# Patient Record
Sex: FEMALE | Race: WHITE | Employment: FULL TIME | URBAN - METROPOLITAN AREA
[De-identification: names, ages, dates, MRNs, and addresses within clinical notes are randomized per-mention and may not be internally consistent; named-entity substitution may affect disease eponyms.]

---

## 2019-01-21 ENCOUNTER — OFFICE VISIT (OUTPATIENT)
Dept: ORTHOPEDIC SURGERY | Age: 64
End: 2019-01-21
Payer: COMMERCIAL

## 2019-01-21 VITALS — HEIGHT: 68 IN | BODY MASS INDEX: 23.49 KG/M2 | WEIGHT: 155 LBS

## 2019-01-21 DIAGNOSIS — R52 PAIN: ICD-10-CM

## 2019-01-21 DIAGNOSIS — T84.84XA PAINFUL TOTAL KNEE REPLACEMENT, INITIAL ENCOUNTER (HCC): Primary | ICD-10-CM

## 2019-01-21 DIAGNOSIS — Z96.659 PAINFUL TOTAL KNEE REPLACEMENT, INITIAL ENCOUNTER (HCC): Primary | ICD-10-CM

## 2019-01-21 PROCEDURE — 1036F TOBACCO NON-USER: CPT | Performed by: ORTHOPAEDIC SURGERY

## 2019-01-21 PROCEDURE — G8427 DOCREV CUR MEDS BY ELIG CLIN: HCPCS | Performed by: ORTHOPAEDIC SURGERY

## 2019-01-21 PROCEDURE — 99203 OFFICE O/P NEW LOW 30 MIN: CPT | Performed by: ORTHOPAEDIC SURGERY

## 2019-01-21 PROCEDURE — 1090F PRES/ABSN URINE INCON ASSESS: CPT | Performed by: ORTHOPAEDIC SURGERY

## 2019-01-21 PROCEDURE — 1101F PT FALLS ASSESS-DOCD LE1/YR: CPT | Performed by: ORTHOPAEDIC SURGERY

## 2019-01-21 PROCEDURE — 1123F ACP DISCUSS/DSCN MKR DOCD: CPT | Performed by: ORTHOPAEDIC SURGERY

## 2019-01-21 PROCEDURE — G8420 CALC BMI NORM PARAMETERS: HCPCS | Performed by: ORTHOPAEDIC SURGERY

## 2019-01-21 PROCEDURE — G8400 PT W/DXA NO RESULTS DOC: HCPCS | Performed by: ORTHOPAEDIC SURGERY

## 2019-01-21 PROCEDURE — 3017F COLORECTAL CA SCREEN DOC REV: CPT | Performed by: ORTHOPAEDIC SURGERY

## 2019-01-21 PROCEDURE — 4040F PNEUMOC VAC/ADMIN/RCVD: CPT | Performed by: ORTHOPAEDIC SURGERY

## 2019-01-21 PROCEDURE — G8484 FLU IMMUNIZE NO ADMIN: HCPCS | Performed by: ORTHOPAEDIC SURGERY

## 2019-01-21 RX ORDER — ALPRAZOLAM 0.25 MG/1
0.25 TABLET ORAL
COMMUNITY

## 2019-01-21 RX ORDER — ZOLPIDEM TARTRATE 10 MG/1
10 TABLET ORAL
COMMUNITY
Start: 2019-01-07 | End: 2019-04-07

## 2019-01-21 RX ORDER — LITHIUM CARBONATE 300 MG/1
600 TABLET, FILM COATED, EXTENDED RELEASE ORAL
COMMUNITY

## 2019-01-21 RX ORDER — LEVOTHYROXINE SODIUM 112 UG/1
112 TABLET ORAL
COMMUNITY
Start: 2018-12-04

## 2019-01-21 RX ORDER — NAFTIFINE HYDROCHLORIDE 1 MG/G
CREAM TOPICAL
COMMUNITY
Start: 2017-09-29

## 2019-01-21 RX ORDER — VENLAFAXINE HYDROCHLORIDE 150 MG/1
150 CAPSULE, EXTENDED RELEASE ORAL
COMMUNITY

## 2019-01-21 RX ORDER — VENLAFAXINE HYDROCHLORIDE 75 MG/1
75 CAPSULE, EXTENDED RELEASE ORAL
COMMUNITY

## 2019-01-21 RX ORDER — NABUMETONE 750 MG/1
750 TABLET, FILM COATED ORAL
COMMUNITY

## 2019-01-21 RX ORDER — TRAZODONE HYDROCHLORIDE 50 MG/1
50 TABLET ORAL
COMMUNITY

## 2019-04-08 ENCOUNTER — OFFICE VISIT (OUTPATIENT)
Dept: ORTHOPEDIC SURGERY | Age: 64
End: 2019-04-08
Payer: COMMERCIAL

## 2019-04-08 VITALS — HEIGHT: 68 IN | BODY MASS INDEX: 23.49 KG/M2 | WEIGHT: 154.98 LBS

## 2019-04-08 DIAGNOSIS — Z96.659 PAINFUL TOTAL KNEE REPLACEMENT, INITIAL ENCOUNTER (HCC): Primary | ICD-10-CM

## 2019-04-08 DIAGNOSIS — T84.84XA PAINFUL TOTAL KNEE REPLACEMENT, INITIAL ENCOUNTER (HCC): Primary | ICD-10-CM

## 2019-04-08 PROCEDURE — 1090F PRES/ABSN URINE INCON ASSESS: CPT | Performed by: ORTHOPAEDIC SURGERY

## 2019-04-08 PROCEDURE — 3017F COLORECTAL CA SCREEN DOC REV: CPT | Performed by: ORTHOPAEDIC SURGERY

## 2019-04-08 PROCEDURE — G8400 PT W/DXA NO RESULTS DOC: HCPCS | Performed by: ORTHOPAEDIC SURGERY

## 2019-04-08 PROCEDURE — 1036F TOBACCO NON-USER: CPT | Performed by: ORTHOPAEDIC SURGERY

## 2019-04-08 PROCEDURE — 4040F PNEUMOC VAC/ADMIN/RCVD: CPT | Performed by: ORTHOPAEDIC SURGERY

## 2019-04-08 PROCEDURE — G8427 DOCREV CUR MEDS BY ELIG CLIN: HCPCS | Performed by: ORTHOPAEDIC SURGERY

## 2019-04-08 PROCEDURE — 1123F ACP DISCUSS/DSCN MKR DOCD: CPT | Performed by: ORTHOPAEDIC SURGERY

## 2019-04-08 PROCEDURE — G8420 CALC BMI NORM PARAMETERS: HCPCS | Performed by: ORTHOPAEDIC SURGERY

## 2019-04-08 PROCEDURE — 99213 OFFICE O/P EST LOW 20 MIN: CPT | Performed by: ORTHOPAEDIC SURGERY

## 2019-04-08 RX ORDER — CEPHALEXIN 500 MG/1
CAPSULE ORAL
Qty: 3 CAPSULE | Refills: 0 | Status: SHIPPED | OUTPATIENT
Start: 2019-04-08

## 2019-04-08 NOTE — PROGRESS NOTES
Chief Complaint    Follow-up (L Knee - still has pain, went back to Dr. Saray Silver, did not have much to offer her. Had fluid taken of knee. culture taken (in Epic), had Bone Scan. Did not have blood work drawn, states did not know she was supposed to.)      History of Present Illness:  Rosa M Lynn is a 70 y.o. female returns today for reevaluation of her left knee. Patient is here with a chief complaint of left knee pain. Patient did have a left total knee arthroplasty performed by Dr. Veronica Young in 2016. Patient then had a revision total knee arthroplasty also done by Dr. Veronica Young on 1/12/2018. Revision surgery was synovectomy and polyethylene exchange. Before surgery patient's symptoms were concentrated over the patellofemoral joint. She went back and saw Dr. Kyaw Lopes did do an aspiration of the knee which did show some elevations in the nuclear cells at 925 (normal range 0-149) and a elevation the neutrophil count at 56 (normal range 0-24%. )  She also bone scan completed back in March which showed no abnormal activity around the left knee prosthesis. Pain Assessment  Location of Pain: Knee  Location Modifiers: Left]       Medical History:  Past Medical History:   Diagnosis Date    Osteoarthritis     Thyroid disease      Patient Active Problem List    Diagnosis Date Noted    Painful total knee replacement, initial encounter ([facility] 01/21/2019     Past Surgical History:   Procedure Laterality Date    JOINT REPLACEMENT      KNEE ARTHROSCOPY      KNEE SURGERY      SHOULDER SURGERY      TONSILLECTOMY       History reviewed. No pertinent family history.   Social History     Socioeconomic History    Marital status: Single     Spouse name: None    Number of children: None    Years of education: None    Highest education level: None   Occupational History    None   Social Needs    Financial resource strain: None    Food insecurity:     Worry: None     Inability: None    Transportation needs: Medical: None     Non-medical: None   Tobacco Use    Smoking status: Never Smoker    Smokeless tobacco: Never Used   Substance and Sexual Activity    Alcohol use: None    Drug use: None    Sexual activity: None   Lifestyle    Physical activity:     Days per week: None     Minutes per session: None    Stress: None   Relationships    Social connections:     Talks on phone: None     Gets together: None     Attends Restoration service: None     Active member of club or organization: None     Attends meetings of clubs or organizations: None     Relationship status: None    Intimate partner violence:     Fear of current or ex partner: None     Emotionally abused: None     Physically abused: None     Forced sexual activity: None   Other Topics Concern    None   Social History Narrative    None     Current Outpatient Medications   Medication Sig Dispense Refill    ALPRAZolam (XANAX) 0.25 MG tablet Take 0.25 mg by mouth. Albertus Reel vitamin D 1000 units CAPS Take 3,000 Units by mouth      lithium (LITHOBID) 300 MG extended release tablet Take 600 mg by mouth      Prenatal Vit-Fe Fumarate-FA (M-VIT PO) daily. Centrum silver      nabumetone (RELAFEN) 750 MG tablet Take 750 mg by mouth      naftifine (NAFTIN) 1 % cream Apply BID      levothyroxine (SYNTHROID) 112 MCG tablet Take 112 mcg by mouth      venlafaxine (EFFEXOR XR) 150 MG extended release capsule Take 150 mg by mouth      traZODone (DESYREL) 50 MG tablet Take 50 mg by mouth      venlafaxine (EFFEXOR XR) 75 MG extended release capsule Take 75 mg by mouth       No current facility-administered medications for this visit. Review of Systems:  Relevant review of systems reviewed and available in the patient's chart    Vital Signs: There were no vitals filed for this visit. General Exam:   Constitutional: Patient is adequately groomed with no evidence of malnutrition  DTRs: Deep tendon reflexes are intact  Mental Status:  The patient is oriented to time, place and person. The patient's mood and affect are appropriate. Lymphatic: The lymphatic examination bilaterally reveals all areas to be without enlargement or induration. Vascular: Examination reveals no swelling or calf tenderness. Peripheral pulses are palpable and 2+. Neurological: The patient has good coordination. There is no weakness or sensory deficit. Body mass index is 23.57 kg/m². Left Knee Examination:    Inspection:  No erythema or signs of infection. There are no cutaneous lesions    Palpation:  There is no tenderness to palpation along the medial and lateral joint line joint line. Tenderness to palpation over the patella. Range of Motion:  0 extension to 120 of active flexion. Strength:  4+/5 quadriceps and hamstrings    Special Tests: The knee is stable to varus valgus stressing/anterior posterior drawer. Negative Homans test.                                 Skin: There are no rashes, ulcerations or lesions. Gait: mildly antalgic favoring the right side    Reflex 2+ patellar    Examination of the right and left hip reveals intact skin. The patient demonstrates full painless range of motion with regards to flexion, abduction, internal and external rotation. There is no tenderness about the greater trochanter. There is a negative straight leg raise against resistance. Strength is 5/5 throughout all planes. Radiology:   EXAM: NUCLEAR MEDICINE THREE-PHASE BONE SCAN           INDICATION: Pain. Left knee replacement.           COMPARISON: None           TECHNIQUE:   Radiopharmaceutical: Technetium 99m MDP: 26 mCi IV   Blood flow and blood pool imaging region: Lower extremities   Delayed imaging area: Lower extremities           FINDINGS:           Blood flow imaging demonstrates symmetric activity.           Blood pool imaging demonstrates symmetric activity.           Delayed images demonstrate symmetric, mild activity adjacent to both knee prostheses.  No    unexpected areas of activity are noted. .       Impression:  Encounter Diagnosis   Name Primary?  Painful total knee replacement, initial encounter ([facility] Yes       Office Procedures:  Orders Placed This Encounter   Procedures    US ARTHR/ASP/INJ MAJOR JNT/BURSA LEFT     Standing Status:   Future     Number of Occurrences:   1     Standing Expiration Date:   4/8/2020    CBC WITH AUTO DIFFERENTIAL     Standing Status:   Future     Standing Expiration Date:   4/8/2020    SEDIMENTATION RATE     Standing Status:   Future     Standing Expiration Date:   4/8/2020    C-REACTIVE PROTEIN     Standing Status:   Future     Standing Expiration Date:   4/8/2020   Procedure: Left knee aspiration  I discussed in detail the risks, benefits, and complications of an aspiration/injection which included but is not limited to infection, skin reactions, hot swollen joints, and anaphylaxis with the patient. The patient verbalized good understanding and gave informed consent for the left knee procedure. The patient was placed in the supine position on the exam table and the skin was prepped using sterile alcohol solution. The superiolateral aspect of the left knee was prepped and draped with sterile technique. The skin and subcutaneous tissues were anesthestized with 5 mL of 1% lidocaine, injected with a 22-gauge needle. After approximately 5 minutes, an 18-gauge needle attached to a 60-ml syringe was inserted into the knee. Then, 10 ml of cloudy synovial fluid was aspirated. . The needle was withdrawn and the puncture site sealed with a Band-Aid. Technique: Under sterile conditions a [employer] ultrasound unit with a variable frequency (6.0-15.0 MHz) linear transducer was used to localize the placement of a 22-gauge needle into the Knee. Findings: Successful needle placement for Knee Aspiration/injection. Final images were taken and saved for permanent record. The patient tolerated the injection well.  The patient was instructed to

## 2019-04-29 ENCOUNTER — OFFICE VISIT (OUTPATIENT)
Dept: ORTHOPEDIC SURGERY | Age: 64
End: 2019-04-29
Payer: COMMERCIAL

## 2019-04-29 DIAGNOSIS — T84.84XD PAIN DUE TO TOTAL LEFT KNEE REPLACEMENT, SUBSEQUENT ENCOUNTER: Primary | ICD-10-CM

## 2019-04-29 DIAGNOSIS — Z96.652 PAIN DUE TO TOTAL LEFT KNEE REPLACEMENT, SUBSEQUENT ENCOUNTER: Primary | ICD-10-CM

## 2019-04-29 PROCEDURE — G8420 CALC BMI NORM PARAMETERS: HCPCS | Performed by: ORTHOPAEDIC SURGERY

## 2019-04-29 PROCEDURE — 1123F ACP DISCUSS/DSCN MKR DOCD: CPT | Performed by: ORTHOPAEDIC SURGERY

## 2019-04-29 PROCEDURE — G8428 CUR MEDS NOT DOCUMENT: HCPCS | Performed by: ORTHOPAEDIC SURGERY

## 2019-04-29 PROCEDURE — 99214 OFFICE O/P EST MOD 30 MIN: CPT | Performed by: ORTHOPAEDIC SURGERY

## 2019-04-29 PROCEDURE — 3017F COLORECTAL CA SCREEN DOC REV: CPT | Performed by: ORTHOPAEDIC SURGERY

## 2019-04-29 PROCEDURE — 1036F TOBACCO NON-USER: CPT | Performed by: ORTHOPAEDIC SURGERY

## 2019-04-29 PROCEDURE — 4040F PNEUMOC VAC/ADMIN/RCVD: CPT | Performed by: ORTHOPAEDIC SURGERY

## 2019-04-29 PROCEDURE — G8400 PT W/DXA NO RESULTS DOC: HCPCS | Performed by: ORTHOPAEDIC SURGERY

## 2019-04-29 PROCEDURE — 1090F PRES/ABSN URINE INCON ASSESS: CPT | Performed by: ORTHOPAEDIC SURGERY

## 2019-04-29 NOTE — LETTER
CONSENT TO SURGICAL OR MEDICAL PROCEDURE    PATIENTS NAMES: Poppy Bob 1955  70 y.o. [phone] (home)   DATE/TIME: 4/29/2019 2:43 PM    1) I consent that Dr. Clint Sultana perform one or more surgical and or medical procedures on the above named patient at: [address] [place] [address]/[facility] to treat the condition(s) which appear indicated by the diagnostic studies already preformed. I have been told in general terms the nature and purpose of the procedure(s) and what the procedure(s) is/are expected to accomplish. They procedure(s) are as follows:   LEFT REVISION KNEE REPLACEMENT (PATELLAR COMPONENT VS TOTAL--DEPUY) AND LATERAL RELEASE     2) It has been explained to me by the informing physician that during the course of any surgical or medical procedure unforeseen condition(s) may be revealed that necessitate an extension of the original procedure(s) or a different procedure(s) than set forth in Paragraph 1. I therefore consent that the above named physician perform such additional or different procedure(s) as are necessary or desirable in the exercise of his professional judgement. 3) I have been made aware by the informing physician of certain reasonably known risks that are associated with the procedure(s) set forth in Paragraph 1.  I understand the reasonably known risk to be: Including but not limited to: CVA, infection, M.I., Phlebitis, Cardiac Arrest and Pulmonary Embolism, Loss of Circulation, Nerve Injury, Delayed Healing, Recurrence, Loss of extremity/digit, R.S.D., Screw breakage, Arthritis, Pain, Swelling, Stiffness, Failure of Prothesis, Fracture, Leg length discrepancy, Wound complication/non-healing, need for further surgery and persistent pain.   4) I have also been informed by the informing physician that there are other risks, from both known and unknown causes, that are attendant to the performance of any surgical or medical procedure(s). I am aware that the practice of surgery and medicine is not an exact science, and I acknowledge that no guarantees have been made to me concerning the results of the procedure(s). 5) I consent to the taking of photographs before, during and after the procedure(s) for scientific and educational purposes. I also understand that medical students and residents may participate in the procedure(s) set forth in Paragraph 1, and I consent to their participation under the supervision of the above named physician. 6) I consent to the administration of anesthesia and to the use of such anesthetics as may be deemed advisable by the anesthesiologist engaged to administer anesthesia. 7) I certify that I have read and understand this consent to the surgical or medical procedure(s); that all the information contained herein was disclosed to me by the informing physician prior to my signing; that all blanks or statements requiring insertions or completion were filled in and inapplicable paragraphs, if any, were stricken before I signed; and that all questions asked by me about the procedure(s) have been fully answered by the informing physician in a satisfactory manner.    ________________________________                           _______________________________  Signature of patient                                                                  Michelle Cai M.D.  ________________________________                           ________________________________  Signature of Informing Physician                                           Informing Physician (Print)    If patient is unable to sign or is a minor, complete one of the following:   A) Patient is a minor ______________ years of age.    B) Patient is unable to sign because_________________________________________________    The undersigned represents that he or she is duly authorized to execute to this consent for and on the behalf of the above named patient.    ________________________________             __________________________________________  Witness                                                                         Parent/Spouse/Guardian/Other:_________________    Medical Record#  Insurance  Smartphone:  Yes   Or   No  Email:                 You have signed a consent to have a total joint replacement surgery performed. Before you can proceed with surgery the following things must be done. Please use this form as a checklist.      _____   Please schedule your Physical Therapy functional evaluation. At the location on your script.    _____   Please take your lab orders and get your blood work done at a [facility].    _____  [place] Linda will need to go to [facility] to complete registration and the medical questionnaire prior to your physical therapy evaluation. Do not schedule an appointment with your primary care physician until you have a surgery date. This pre-op exam has to be within 30 days of the surgery. _____  CT Scans will be scheduled by our office.    _____  Information about the pre-op class will be in your surgery packet that will be mailed to you after you are scheduled for surgery. Once you have completed both the labs and the evaluation please call Gardenia Terry @ [phone] to let her know. Once verification of the PT Evaluation and completed labs has been determined you will be called and set up for surgery. This may take 1-2 days to check results and return your phone call.

## 2019-04-29 NOTE — PROGRESS NOTES
Chief Complaint    Results (labs/synovasure lt knee)      History of Present Illness:  Dank Kuo is a 70 y.o. female returns today for follow-up of her left anterior knee pain. She had a left total knee replacement performed by Dr. Seth Lr for primary osteoarthritis of the knee. She has persistent 8 out of 10 anterior knee pain without relief. She's tried oral anti-inflammatories, bracing, physical therapy none of which significantly decrease of pain. She also had a right knee performed and this has been performing fairly well. She did have labs and a bone scan performed. We also did an aspiration with alpha defensin. Pain Assessment  Location of Pain: Knee  Location Modifiers: Left  Severity of Pain: 8  Frequency of Pain: Intermittent  Aggravating Factors: Walking, Standing, Stairs  Limiting Behavior: Some  Work-Related Injury: No  Are there other pain locations you wish to document?: No    Medical History:  Patient's medications, allergies, past medical, surgical, social and family histories were reviewed and updated as appropriate. Review of Systems:  Pertinent items are noted in HPI  Review of systems reviewed from Patient History Form dated on last visit and available in the patient's chart under the Media tab. Vital Signs: There were no vitals taken for this visit. General Exam:   Constitutional: Patient is adequately groomed with no evidence of malnutrition  Mental Status: The patient is oriented to time, place and person. The patient's mood and affect are appropriate. Lymphatic: The lymphatic examination bilaterally reveals all areas to be without enlargement or induration. Vascular: Examination reveals no swelling or calf tenderness. Peripheral pulses are palpable and 2+. Neurological: The patient has good coordination. There is no weakness or sensory deficit.     Left Knee Examination:    Inspection:  No skin abnormalities noted, no evidence of lymphangitis or lymphedema, ecchymosis or bruising. Palpation:  Pain to palpation around the anterior aspect of her knee both medially and laterally. Range of Motion:  Fully cemented to 120° knee flexion. Strength:  4+5 quad and hamstring strength    Special Tests:  Positive patellar grind test, no evidence of instability. Skin: There are no rashes, ulcerations or lesions. Gait: Antalgic gait pattern on the left      Additional Comments:       Additional Examinations:         Right Lower Extremity: Examination of the right lower extremity does not show any tenderness, deformity or injury. Range of motion is unremarkable. There is no gross instability. There are no rashes, ulcerations or lesions. Strength and tone are normal.    Radiology:     X-rays obtained and reviewed in office:  Views 3  Location left knee including AP, lateral and skyline view  Impression aseptic loosening of the patellar component. The tibiofemoral component seemed to be in good position. Assessment :  Aseptic loosening left total knee, patellar component    Impression:  Encounter Diagnosis   Name Primary?  Pain due to total left knee replacement, subsequent encounter Yes       Office Procedures:  No orders of the defined types were placed in this encounter. Treatment Plan:  Risks, benefits, alternatives to surgery clear discussed, patient would like to proceed with revision left total knee replacement primarily patellofemoral component but we will examine the tibia and femur and see if these are loose. She will also benefit from a lateral retinacular release. Risks benefits alternatives to surgery clear discussed, patient would like to proceed with surgery. We discussed the risk, benefits, and potential complications of knee replacement arthroplasty surgery. The patient voiced their understanding to concerns that include infection, deep vein thrombosis, neurological injury, and delayed rehabilitation.  The patient also realizes that there are concerns regarding the potential need for manipulation under anesthesia if range of motion proves to be problematic. The patient also understands that there is always a chance of dystrophy and anesthetic complications that would include a stroke, cardiopulmonary pathology, and even death. We also discussed the rehabilitation process involved with this operation and options that involved not only the hospitalization but the need for outpatient physical therapy as well as an independent home exercise program. The patient also realizes the need for a knee brace and ambulatory aids in the rehabilitation process as well as the very significant role that the patient plays in terms of rehabilitation after this type of operation. They understand that overall that they may be functional at 6-8 weeks postop but it may take 9 months to year for a full recovery. All questions were answered.

## 2019-05-15 ENCOUNTER — TELEPHONE (OUTPATIENT)
Dept: ORTHOPEDIC SURGERY | Age: 64
End: 2019-05-15

## 2019-05-16 ENCOUNTER — TELEPHONE (OUTPATIENT)
Dept: ORTHOPEDIC SURGERY | Age: 64
End: 2019-05-16

## 2019-05-17 ENCOUNTER — TELEPHONE (OUTPATIENT)
Dept: ORTHOPEDIC SURGERY | Age: 64
End: 2019-05-17

## 2019-05-17 NOTE — TELEPHONE ENCOUNTER
FYI - PATIENT CANCELLED SURGERY FOR 6/4 DUE TO AN INJURY AT WORK. SHE WILL CALL TO RESCHEDULE ONCE SHE IS READY.

## 2019-06-05 ENCOUNTER — TELEPHONE (OUTPATIENT)
Dept: ORTHOPEDIC SURGERY | Age: 64
End: 2019-06-05

## 2019-06-05 NOTE — TELEPHONE ENCOUNTER
The insurance company called to confirm the patient had sx yesterday. I looked up the chart notes and saw the patient cancelled surgery for now and will call back to reschedule.

## 2019-11-05 ENCOUNTER — OFFICE VISIT (OUTPATIENT)
Dept: ORTHOPEDIC SURGERY | Age: 64
End: 2019-11-05
Payer: COMMERCIAL

## 2019-11-05 VITALS
DIASTOLIC BLOOD PRESSURE: 76 MMHG | WEIGHT: 154.98 LBS | HEART RATE: 76 BPM | SYSTOLIC BLOOD PRESSURE: 115 MMHG | HEIGHT: 68 IN | BODY MASS INDEX: 23.49 KG/M2

## 2019-11-05 DIAGNOSIS — T84.84XA PAINFUL TOTAL KNEE REPLACEMENT, INITIAL ENCOUNTER (HCC): Primary | ICD-10-CM

## 2019-11-05 DIAGNOSIS — Z96.659 PAINFUL TOTAL KNEE REPLACEMENT, INITIAL ENCOUNTER (HCC): Primary | ICD-10-CM

## 2019-11-05 PROCEDURE — G8420 CALC BMI NORM PARAMETERS: HCPCS | Performed by: ORTHOPAEDIC SURGERY

## 2019-11-05 PROCEDURE — 1123F ACP DISCUSS/DSCN MKR DOCD: CPT | Performed by: ORTHOPAEDIC SURGERY

## 2019-11-05 PROCEDURE — 1090F PRES/ABSN URINE INCON ASSESS: CPT | Performed by: ORTHOPAEDIC SURGERY

## 2019-11-05 PROCEDURE — 1036F TOBACCO NON-USER: CPT | Performed by: ORTHOPAEDIC SURGERY

## 2019-11-05 PROCEDURE — G8484 FLU IMMUNIZE NO ADMIN: HCPCS | Performed by: ORTHOPAEDIC SURGERY

## 2019-11-05 PROCEDURE — G8427 DOCREV CUR MEDS BY ELIG CLIN: HCPCS | Performed by: ORTHOPAEDIC SURGERY

## 2019-11-05 PROCEDURE — 3017F COLORECTAL CA SCREEN DOC REV: CPT | Performed by: ORTHOPAEDIC SURGERY

## 2019-11-05 PROCEDURE — 4040F PNEUMOC VAC/ADMIN/RCVD: CPT | Performed by: ORTHOPAEDIC SURGERY

## 2019-11-05 PROCEDURE — G8400 PT W/DXA NO RESULTS DOC: HCPCS | Performed by: ORTHOPAEDIC SURGERY

## 2019-11-05 PROCEDURE — 99214 OFFICE O/P EST MOD 30 MIN: CPT | Performed by: ORTHOPAEDIC SURGERY

## 2019-11-25 ENCOUNTER — TELEPHONE (OUTPATIENT)
Dept: ORTHOPEDIC SURGERY | Age: 64
End: 2019-11-25

## 2019-12-09 ENCOUNTER — OFFICE VISIT (OUTPATIENT)
Dept: ORTHOPEDIC SURGERY | Age: 64
End: 2019-12-09
Payer: COMMERCIAL

## 2019-12-09 VITALS — BODY MASS INDEX: 22.88 KG/M2 | WEIGHT: 151 LBS | HEIGHT: 68 IN

## 2019-12-09 DIAGNOSIS — T84.84XD PAIN DUE TO TOTAL LEFT KNEE REPLACEMENT, SUBSEQUENT ENCOUNTER: Primary | ICD-10-CM

## 2019-12-09 DIAGNOSIS — Z96.652 PAIN DUE TO TOTAL LEFT KNEE REPLACEMENT, SUBSEQUENT ENCOUNTER: Primary | ICD-10-CM

## 2019-12-09 PROCEDURE — 4040F PNEUMOC VAC/ADMIN/RCVD: CPT | Performed by: ORTHOPAEDIC SURGERY

## 2019-12-09 PROCEDURE — L1830 KO IMMOB CANVAS LONG PRE OTS: HCPCS | Performed by: ORTHOPAEDIC SURGERY

## 2019-12-09 PROCEDURE — G8484 FLU IMMUNIZE NO ADMIN: HCPCS | Performed by: ORTHOPAEDIC SURGERY

## 2019-12-09 PROCEDURE — 1123F ACP DISCUSS/DSCN MKR DOCD: CPT | Performed by: ORTHOPAEDIC SURGERY

## 2019-12-09 PROCEDURE — 1036F TOBACCO NON-USER: CPT | Performed by: ORTHOPAEDIC SURGERY

## 2019-12-09 PROCEDURE — 3017F COLORECTAL CA SCREEN DOC REV: CPT | Performed by: ORTHOPAEDIC SURGERY

## 2019-12-09 PROCEDURE — 1090F PRES/ABSN URINE INCON ASSESS: CPT | Performed by: ORTHOPAEDIC SURGERY

## 2019-12-09 PROCEDURE — G8400 PT W/DXA NO RESULTS DOC: HCPCS | Performed by: ORTHOPAEDIC SURGERY

## 2019-12-09 PROCEDURE — 99214 OFFICE O/P EST MOD 30 MIN: CPT | Performed by: ORTHOPAEDIC SURGERY

## 2019-12-09 PROCEDURE — G8420 CALC BMI NORM PARAMETERS: HCPCS | Performed by: ORTHOPAEDIC SURGERY

## 2019-12-09 PROCEDURE — G8428 CUR MEDS NOT DOCUMENT: HCPCS | Performed by: ORTHOPAEDIC SURGERY

## 2019-12-30 ENCOUNTER — TELEPHONE (OUTPATIENT)
Dept: ORTHOPEDIC SURGERY | Age: 64
End: 2019-12-30

## 2019-12-30 NOTE — TELEPHONE ENCOUNTER
ORTHOPAEDIC NURSE NAVIGATOR SUMMARY NOTE    Pt is having surgery at [place], [place] will sign off.     Gregory Carrizales  12/30/2019

## 2020-01-06 ENCOUNTER — TELEPHONE (OUTPATIENT)
Dept: ORTHOPEDIC SURGERY | Age: 65
End: 2020-01-06

## 2020-01-15 DIAGNOSIS — Z96.652 PAIN DUE TO TOTAL LEFT KNEE REPLACEMENT, SUBSEQUENT ENCOUNTER: Primary | ICD-10-CM

## 2020-01-15 DIAGNOSIS — T84.84XD PAIN DUE TO TOTAL LEFT KNEE REPLACEMENT, SUBSEQUENT ENCOUNTER: Primary | ICD-10-CM

## 2020-01-28 ENCOUNTER — OFFICE VISIT (OUTPATIENT)
Dept: ORTHOPEDIC SURGERY | Age: 65
End: 2020-01-28

## 2020-01-28 PROCEDURE — 99024 POSTOP FOLLOW-UP VISIT: CPT | Performed by: PHYSICIAN ASSISTANT

## 2020-02-18 ENCOUNTER — OFFICE VISIT (OUTPATIENT)
Dept: ORTHOPEDIC SURGERY | Age: 65
End: 2020-02-18

## 2020-02-18 PROCEDURE — 99024 POSTOP FOLLOW-UP VISIT: CPT | Performed by: PHYSICIAN ASSISTANT

## 2020-02-18 NOTE — PROGRESS NOTES
Diagnosis: Status post left total knee replacement-revision    History of present illness: The patient returns today following total knee replacement. The pain has been well controlled on oral analgesics. They have no complaints of constitutional symptoms. Pain Assessment  Location of Pain: Knee  Location Modifiers: Left  Severity of Pain: 3  Quality of Pain: Dull, Aching  Duration of Pain: Persistent  Frequency of Pain: Intermittent  Aggravating Factors: Walking, Bending, Standing, Squatting, Stairs  Limiting Behavior: Some  Relieving Factors: Rest]    Physical exam: The incision is clean, dry and intact with no drainage. Range of motion is  0 degrees of extension to 125 degrees of flexion. There is expected swelling with no evidence of DVT and a negative Nathan's sign. Neruovascular exam is intact. Assessment/Plan: We would like to begin outpatient physical therapy to restore range of motion and strength. The patient was given local wound care instructions. We did not refill their pain medicaion today. We will follow up in 4 weeks for re-evaluation. 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

## 2020-02-18 NOTE — LETTER
[address] [address]  Phone: [phone]  Fax: [phone]    Cathy Dalal        February 18, 2020     Patient: Isaac Koehler   YOB: 1955   Date of Visit: 2/18/2020       To Whom It May Concern: It is my medical opinion that Isaac Koehler may return to work on 3/10/2020 without restrictions. If you have any questions or concerns, please don't hesitate to call.     Sincerely,    Preston Edwards PA-C

## 2020-03-25 ENCOUNTER — TELEPHONE (OUTPATIENT)
Dept: ORTHOPEDIC SURGERY | Age: 65
End: 2020-03-25

## 2020-03-25 NOTE — TELEPHONE ENCOUNTER
Spoke to patient. Overall  She is 90-95 percent better then before her revision surgery. She will continue with a HEP and will f/u in office once the situation with the pandemic comes down.

## 2020-04-03 ENCOUNTER — TELEPHONE (OUTPATIENT)
Dept: ORTHOPEDIC SURGERY | Age: 65
End: 2020-04-03

## 2020-04-03 NOTE — TELEPHONE ENCOUNTER
Patient is insisting on coming in for an appt. She said something is not right were her knee. Can I schedule her for Monday?

## 2020-04-03 NOTE — TELEPHONE ENCOUNTER
Patient is already taking NSAIDS. No need for an appt on Monday. No injury. The knee feels better when elevated. Please add to her current restriction that she must elevate at work. Offered her time off but she does't want it. Please send the new work to the email address listed below. [email or web address]